# Patient Record
Sex: OTHER/UNKNOWN
[De-identification: names, ages, dates, MRNs, and addresses within clinical notes are randomized per-mention and may not be internally consistent; named-entity substitution may affect disease eponyms.]

---

## 2021-10-21 ENCOUNTER — DASHBOARD ENCOUNTERS (OUTPATIENT)
Age: 26
End: 2021-10-21

## 2021-10-22 ENCOUNTER — OFFICE VISIT (OUTPATIENT)
Dept: URBAN - METROPOLITAN AREA CLINIC 31 | Facility: CLINIC | Age: 26
End: 2021-10-22

## 2021-10-22 NOTE — HPI-MIGRATED HPI
;     Abdominal Pain : Patient present today for consult of abdominal pain. Patient admits abdominal pain that is located -- and is described as a --.   Pain started -- and occurs --.  Patient denies/admits any aggravating/alleviating factors.   --BMs per day, with--stool. She admits/denies melena, blood, or mucus in stool.  She/He has tried ______ with/without relief of their symptoms.  Patient denies/admits the use of antibiotics within the last (3-6) months. No previous labs, radiology, or procedures.   No hospital/ER visits for their pain.  Denies/Admits a family history of colon cancer or diseases/esophageal or gastric cancer or diseases.    Denies/Admits a past Colonoscopy/EGD.  Patient currently admits __ BMs per day. Stools are-- with/without melena, blood, or mucus.  ;

## 2021-12-02 ENCOUNTER — OFFICE VISIT (OUTPATIENT)
Dept: URBAN - METROPOLITAN AREA CLINIC 33 | Facility: CLINIC | Age: 26
End: 2021-12-02